# Patient Record
Sex: MALE | URBAN - METROPOLITAN AREA
[De-identification: names, ages, dates, MRNs, and addresses within clinical notes are randomized per-mention and may not be internally consistent; named-entity substitution may affect disease eponyms.]

---

## 2017-01-01 ENCOUNTER — MEDICAL CORRESPONDENCE (OUTPATIENT)
Dept: HEALTH INFORMATION MANAGEMENT | Facility: CLINIC | Age: 82
End: 2017-01-01

## 2017-01-01 ENCOUNTER — NURSING HOME VISIT (OUTPATIENT)
Dept: GERIATRICS | Facility: CLINIC | Age: 82
End: 2017-01-01
Payer: COMMERCIAL

## 2017-01-01 ENCOUNTER — TRANSFERRED RECORDS (OUTPATIENT)
Dept: HEALTH INFORMATION MANAGEMENT | Facility: CLINIC | Age: 82
End: 2017-01-01

## 2017-01-01 VITALS
OXYGEN SATURATION: 94 % | WEIGHT: 144.6 LBS | BODY MASS INDEX: 23.24 KG/M2 | TEMPERATURE: 97 F | RESPIRATION RATE: 20 BRPM | HEIGHT: 66 IN | HEART RATE: 66 BPM | DIASTOLIC BLOOD PRESSURE: 80 MMHG | SYSTOLIC BLOOD PRESSURE: 124 MMHG

## 2017-01-01 VITALS
RESPIRATION RATE: 20 BRPM | WEIGHT: 144.6 LBS | TEMPERATURE: 97 F | BODY MASS INDEX: 23.24 KG/M2 | HEIGHT: 66 IN | SYSTOLIC BLOOD PRESSURE: 118 MMHG | DIASTOLIC BLOOD PRESSURE: 70 MMHG | OXYGEN SATURATION: 94 % | HEART RATE: 78 BPM

## 2017-01-01 VITALS
SYSTOLIC BLOOD PRESSURE: 112 MMHG | DIASTOLIC BLOOD PRESSURE: 65 MMHG | RESPIRATION RATE: 19 BRPM | HEART RATE: 76 BPM | OXYGEN SATURATION: 99 % | TEMPERATURE: 97.6 F

## 2017-01-01 DIAGNOSIS — D63.1 ANEMIA IN CKD (CHRONIC KIDNEY DISEASE): ICD-10-CM

## 2017-01-01 DIAGNOSIS — T81.89XD NON-HEALING SURGICAL WOUND, SUBSEQUENT ENCOUNTER: ICD-10-CM

## 2017-01-01 DIAGNOSIS — H92.02 LEFT EAR PAIN: ICD-10-CM

## 2017-01-01 DIAGNOSIS — E03.9 ACQUIRED HYPOTHYROIDISM: ICD-10-CM

## 2017-01-01 DIAGNOSIS — H92.21 BLEEDING FROM RIGHT EAR: ICD-10-CM

## 2017-01-01 DIAGNOSIS — K59.00 CONSTIPATION, UNSPECIFIED CONSTIPATION TYPE: ICD-10-CM

## 2017-01-01 DIAGNOSIS — H92.21 BLEEDING FROM RIGHT EAR: Primary | ICD-10-CM

## 2017-01-01 DIAGNOSIS — I10 ESSENTIAL HYPERTENSION: ICD-10-CM

## 2017-01-01 DIAGNOSIS — R26.9 GAIT DISTURBANCE: ICD-10-CM

## 2017-01-01 DIAGNOSIS — I50.42 CHRONIC COMBINED SYSTOLIC AND DIASTOLIC CHF (CONGESTIVE HEART FAILURE) (H): ICD-10-CM

## 2017-01-01 DIAGNOSIS — N25.81 HYPERPARATHYROIDISM, SECONDARY (H): ICD-10-CM

## 2017-01-01 DIAGNOSIS — E53.9 VITAMIN B DEFICIENCY: ICD-10-CM

## 2017-01-01 DIAGNOSIS — Z51.5 HOSPICE CARE PATIENT: ICD-10-CM

## 2017-01-01 DIAGNOSIS — N18.9 ANEMIA IN CKD (CHRONIC KIDNEY DISEASE): ICD-10-CM

## 2017-01-01 DIAGNOSIS — N18.5 CKD (CHRONIC KIDNEY DISEASE) STAGE 5, GFR LESS THAN 15 ML/MIN (H): ICD-10-CM

## 2017-01-01 DIAGNOSIS — E03.9 HYPOTHYROIDISM, UNSPECIFIED TYPE: ICD-10-CM

## 2017-01-01 DIAGNOSIS — Z53.9 ERRONEOUS ENCOUNTER--DISREGARD: Primary | ICD-10-CM

## 2017-01-01 DIAGNOSIS — T81.89XD NON-HEALING SURGICAL WOUND, SUBSEQUENT ENCOUNTER: Primary | ICD-10-CM

## 2017-01-01 DIAGNOSIS — R60.0 EDEMA EXTREMITIES: ICD-10-CM

## 2017-01-01 DIAGNOSIS — C44.229 SQUAMOUS CELL CARCINOMA OF SKIN OF LEFT EAR: ICD-10-CM

## 2017-01-01 DIAGNOSIS — E78.49 OTHER HYPERLIPIDEMIA: ICD-10-CM

## 2017-01-01 DIAGNOSIS — C44.229 SQUAMOUS CELL CARCINOMA OF LEFT EAR: ICD-10-CM

## 2017-01-01 DIAGNOSIS — N18.4 CHRONIC KIDNEY DISEASE, STAGE IV (SEVERE) (H): ICD-10-CM

## 2017-01-01 LAB
BUN SERPL-MCNC: 110 MG/DL (ref 9–26)
CALCIUM SERPL-MCNC: 7.7 MG/DL (ref 8.4–10.2)
CHLORIDE SERPLBLD-SCNC: 104 MMOL/L (ref 98–109)
CO2 SERPL-SCNC: 15 MMOL/L (ref 22–31)
CREAT SERPL-MCNC: 4.34 MG/DL (ref 0.73–1.18)
DIFFERENTIAL: ABNORMAL
ERYTHROCYTE [DISTWIDTH] IN BLOOD BY AUTOMATED COUNT: 15.7 % (ref 11–15)
GFR SERPL CREATININE-BSD FRML MDRD: 10 ML/MIN/1.73M2
GLUCOSE SERPL-MCNC: 100 MG/DL (ref 70–100)
HCT VFR BLD AUTO: 25.2 % (ref 29–51)
HEMOGLOBIN: 8.7 G/DL (ref 13.4–17.5)
MCV RBC AUTO: 90 FL (ref 80–100)
PLATELET # BLD AUTO: 100 K/CMM (ref 140–450)
POTASSIUM SERPL-SCNC: 4.7 MMOL/L (ref 3.5–5.2)
RBC # BLD AUTO: 2.8 M/CMM (ref 4.2–5.9)
SODIUM SERPL-SCNC: 131 MMOL/L (ref 136–145)
WBC # BLD AUTO: 2.9 K/CMM (ref 3.8–11)

## 2017-01-01 PROCEDURE — 99308 SBSQ NF CARE LOW MDM 20: CPT | Mod: GV | Performed by: NURSE PRACTITIONER

## 2017-01-01 PROCEDURE — 99207 ZZC CDG-CORRECTLY CODED, REVIEWED AND AGREE: CPT | Performed by: INTERNAL MEDICINE

## 2017-01-01 PROCEDURE — 99306 1ST NF CARE HIGH MDM 50: CPT | Performed by: INTERNAL MEDICINE

## 2017-01-01 PROCEDURE — 99207 ZZC CDG-CORRECTLY CODED, REVIEWED AND AGREE: CPT | Performed by: NURSE PRACTITIONER

## 2017-01-01 RX ORDER — CARVEDILOL 6.25 MG/1
6.25 TABLET ORAL 2 TIMES DAILY WITH MEALS
COMMUNITY

## 2017-01-01 RX ORDER — LEVOTHYROXINE SODIUM 100 UG/1
100 TABLET ORAL DAILY
COMMUNITY

## 2017-01-01 RX ORDER — AMOXICILLIN 250 MG
2 CAPSULE ORAL DAILY PRN
COMMUNITY

## 2017-01-01 RX ORDER — OXYCODONE HYDROCHLORIDE 5 MG/1
2.5 TABLET ORAL
COMMUNITY

## 2017-01-01 RX ORDER — MORPHINE SULFATE 100 MG/5ML
5 SOLUTION ORAL
COMMUNITY

## 2017-01-01 RX ORDER — SIMVASTATIN 40 MG
40 TABLET ORAL AT BEDTIME
COMMUNITY
End: 2017-01-01

## 2017-01-01 RX ORDER — ACETAMINOPHEN 500 MG
500 TABLET ORAL EVERY 6 HOURS PRN
COMMUNITY

## 2017-01-01 RX ORDER — TAMSULOSIN HYDROCHLORIDE 0.4 MG/1
0.4 CAPSULE ORAL AT BEDTIME
COMMUNITY

## 2017-01-01 RX ORDER — HALOPERIDOL 2 MG/ML
2 SOLUTION ORAL EVERY 6 HOURS PRN
COMMUNITY

## 2017-01-01 RX ORDER — FUROSEMIDE 20 MG
20 TABLET ORAL EVERY MORNING
COMMUNITY
Start: 2017-01-01

## 2017-01-01 RX ORDER — CALCITRIOL 0.25 UG/1
0.25 CAPSULE, LIQUID FILLED ORAL
COMMUNITY

## 2017-01-01 RX ORDER — ISOSORBIDE MONONITRATE 30 MG/1
30 TABLET, EXTENDED RELEASE ORAL DAILY
COMMUNITY

## 2017-01-01 RX ORDER — TRAMADOL HYDROCHLORIDE 50 MG/1
25-50 TABLET ORAL EVERY 6 HOURS PRN
COMMUNITY
End: 2017-01-01

## 2017-05-03 PROBLEM — E53.9 VITAMIN B DEFICIENCY: Status: ACTIVE | Noted: 2017-01-01

## 2017-05-03 PROBLEM — Z87.891 HISTORY OF TOBACCO USE: Status: ACTIVE | Noted: 2017-01-01

## 2017-05-03 PROBLEM — I10 ESSENTIAL HYPERTENSION: Status: ACTIVE | Noted: 2017-01-01

## 2017-05-03 PROBLEM — N18.9 ANEMIA IN CKD (CHRONIC KIDNEY DISEASE): Status: ACTIVE | Noted: 2017-01-01

## 2017-05-03 PROBLEM — Z00.00 ROUTINE GENERAL MEDICAL EXAMINATION AT A HEALTH CARE FACILITY: Status: ACTIVE | Noted: 2017-01-01

## 2017-05-03 PROBLEM — Z79.891 LONG TERM PRESCRIPTION OPIATE USE: Status: ACTIVE | Noted: 2017-01-01

## 2017-05-03 PROBLEM — H92.21 BLEEDING FROM RIGHT EAR: Status: ACTIVE | Noted: 2017-01-01

## 2017-05-03 PROBLEM — I65.22 OCCLUSION OF LEFT CAROTID ARTERY: Status: ACTIVE | Noted: 2017-01-01

## 2017-05-03 PROBLEM — N25.81 HYPERPARATHYROIDISM, SECONDARY (H): Status: ACTIVE | Noted: 2017-01-01

## 2017-05-03 PROBLEM — D63.1 ANEMIA IN CKD (CHRONIC KIDNEY DISEASE): Status: ACTIVE | Noted: 2017-01-01

## 2017-05-03 PROBLEM — E78.5 HYPERLIPIDEMIA: Status: ACTIVE | Noted: 2017-01-01

## 2017-05-03 PROBLEM — K59.00 CONSTIPATION: Status: ACTIVE | Noted: 2017-01-01

## 2017-05-03 PROBLEM — E03.9 HYPOTHYROIDISM: Status: ACTIVE | Noted: 2017-01-01

## 2017-05-03 PROBLEM — G45.9 TRANSIENT CEREBRAL ISCHEMIA: Status: ACTIVE | Noted: 2017-01-01

## 2017-05-03 PROBLEM — T81.89XD NON-HEALING SURGICAL WOUND, SUBSEQUENT ENCOUNTER: Status: ACTIVE | Noted: 2017-01-01

## 2017-05-03 PROBLEM — I50.42 CHRONIC COMBINED SYSTOLIC AND DIASTOLIC CHF (CONGESTIVE HEART FAILURE) (H): Status: ACTIVE | Noted: 2017-01-01

## 2017-05-03 NOTE — PROGRESS NOTES
"[facility]  PRIMARY CARE PROVIDER AND CLINIC:  [facility] [address] / [address] [address]  Chief Complaint   Patient presents with     Establish Care       HPI:    Cameron Stockton is a 89 year old  (4/10/1917),admitted to the [facility]  from Home; Hospice through [facility] beginning 4/26/17. Admitted to this facility for  medical management, nursing care and hospice. Current issues are:     Per 4/20/17 progress note by Usman Benson MD:    \"Cameron Stockton presents to the wound clinic at [facility] for evaluation and treatment of his left ear wound. 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 The patient is accompanied in the wound clinic by his son-in-law and his wife. They all provide portions of the history. I also collected portions of the patient's history from the electronic medical record. The patient's son-in-law indicates that they have elected not to proceed with any further surgery or palliative radiation therapy. Both patient and his family members state that they understand that we cannot get the wound to heal because there is cancer there. They state that their goal is to make the dressing changes more comfortable and to decrease bleeding from the wound. The patient's son-in-law reports he had been doing the dressing changes himself but more recently there are home health nurses coming out doing the dressing changes once a day. The dressing changes consist of a wet-to-dry dressing with gauze soaked with 50% Vashe and 50% lidocaine. They report however that the patient has quite a bit of pain with dressing changes and there has been more leading from the wound recently.   They confirmed that the wound has been present since his last surgery which was over a year ago in [place].   Today he denies fevers or " "chills. he reports the pain is a 0/10, which remained the same recently.   The patient reports maintaining a regular diet without special attention to protein intake.    I have suggested that instead of doing a wet-to-dry dressing with the Vashe lidocaine mixed we should do a 10 minute soak with the 50-50 mix of Vashe and lidocaine. After this soak we should line the cavity with a single layer of Adaptic and then fill the cavity with silver alginate for absorption and antimicrobial effect followed by an ABD pad changed once a day and p.r.n.   I confirmed the patient's belief that the wound will not heal while there is cancer in the wound bed and so our goal here is not wound healing but rather treatment of these symptoms relating to the wound which we call palliative wound care. Our goals here will to be to make the dressing changes less painful and to decrease bleeding. I'm hoping to be able to this by making the bandages less adherent to the wound bed.   I also explained to him the importance of protein to wound healing and have recommended he look into taking a protein supplement to aid in his wound healing. I explained to the patient that wound healing requires a substantial amount of protein and I recommend the patient take 2-3 protein supplements a day with the goal of consuming 100 g of protein a day.   The patient will return to the wound clinic in four weeks to see me again.\"    **Of note pt was in the hospital 3/27/17 when he had returned from wintering gin [place] and went in with incased LE edema and Sob.  He was up 20# and was diuresed.  At that time  Dialysis was offered to him, he declined. He was sent home to his senior appt where he lived with his wife.    Today on exam, he is very [place] but says: No CP, SOB,  dizziness, fevers, chills, HA, N/V, dysuria.  Some constipation at times. Appetite is fair.  No pain except left ear.   Occas Cough.         CODE STATUS/ADVANCE DIRECTIVES DISCUSSION:   DNR / " DNI  Patient's living condition: lives with spouse    ALLERGIES:Doxycycline  PAST MEDICAL HISTORY:  has a past medical history of Asthmatic bronchitis; CVA (cerebral infarction); HTN; and Hypothyroid.  PAST SURGICAL HISTORY:  has a past surgical history that includes hernia repair, inguinal rt/lt; cataract iol, rt/lt (8-04); and cataract iol, rt/lt (9-04).  FAMILY HISTORY: family history is not on file.  SOCIAL HISTORY:  reports that he has quit smoking. He does not have any smokeless tobacco history on file.    Post Discharge Medication Reconciliation Status: discharge medications reconciled and changed, per note/orders (see AVS).  Current Outpatient Prescriptions   Medication Sig Dispense Refill     acetaminophen (TYLENOL) 500 MG tablet Take 500 mg by mouth every 6 hours as needed for mild pain       calcitRIOL (ROCALTROL) 0.25 MCG capsule Take 0.25 mcg by mouth Every Mon, Wed, Fri       carvedilol (COREG) 6.25 MG tablet Take 6.25 mg by mouth 2 times daily (with meals) Hold if SBP <110 or HR <55 call if held x 2 in a row or symptomatic       furosemide (LASIX) 20 MG tablet Take 40 mg by mouth every morning       isosorbide mononitrate (IMDUR) 30 MG 24 hr tablet Take 30 mg by mouth daily Hold if HR <55 call if held x 2 in a row or symptomatic       levothyroxine (SYNTHROID/LEVOTHROID) 100 MCG tablet Take 100 mcg by mouth daily       morphine 0.1% in solosite topical gel Apply to left ear wound with vaseline gauze, allow to sit 20-30 minutes then remove gauze and finish with drsg changes per intructionns       oxyCODONE (ROXICODONE) 5 MG IR tablet Take 2.5 mg by mouth every 2 hours as needed for moderate to severe pain       senna-docusate (SENOKOT-S;PERICOLACE) 8.6-50 MG per tablet Take 2 tablets by mouth daily as needed for constipation       Dakins 0.125 % SOLN Use to irrigate wound behind left ear daily       tamsulosin (FLOMAX) 0.4 MG capsule Take 0.4 mg by mouth At Bedtime       traMADol (ULTRAM) 50 MG tablet  Take 25-50 mg by mouth every 6 hours as needed for moderate pain Give 25 mg for pain 1-5/10 or 50 mg for pain 6-10/10       morphine sulfate, high concentrate, (ROXANOL-CONCENTRATED) 20 MG/ML concentrated solution Take 5 mg by mouth every 2 hours as needed for shortness of breath / dyspnea or moderate to severe pain         ROS:  10 point ROS of systems including Constitutional, Eyes, Respiratory, Cardiovascular, Gastroenterology, Genitourinary, Integumentary, Muscularskeletal, Psychiatric were all negative except for pertinent positives noted in my HPI.    Exam:  /65  Pulse 76  Temp 97.6  F (36.4  C)  Resp 19  SpO2 99%     GENERAL APPEARANCE:  Alert, in no distress, oriented, cooperative  ENT:  Mouth and posterior oropharynx normal, moist mucous membranes, [place], ear pinna abnormal right with dried blood/open small area.  on left side has large open ulcerated wound about 6cm x 4cm rough diameter(see nurses notes for exact measurements)  EYES:  EOM, conjunctivae, lids, pupils and irises normal  NECK:  No adenopathy,masses or thyromegaly  RESP:  respiratory effort and palpation of chest normal, lungs clear to auscultation , no respiratory distress  CV:  Palpation and auscultation of heart done , RRR with soft NAJMA.  No rub, or gallop, +2 bilat feet to below knees' edema, +1 pedal pulses  ABDOMEN:  normal bowel sounds, soft, nontender, no hepatosplenomegaly or other masses  M/S:   DOLAN equally, up with assist and walker  SKIN:  Inspection of skin and subcutaneous tissue baseline, wound appearance: see under ENT section  NEURO:   Cranial nerves 2-12 are normal tested and grossly at patient's baseline  PSYCH:  oriented X 3, normal insight, judgement and memory, affect and mood normal       Lab/Diagnostic data: Hospital labs on 3/27/17:  WBC 3.5, Hgb 8.7, Na 133, K 4.4, , CO2 20, BUN 63, Cr 3.90, Ca 7.4, Phos 4.9, gluc 82, BNP 3318 and TSH 15.94      ASSESSMENT / PLAN:  (C44.229) Squamous cell carcinoma of  left ear/scalp   (T81.89XD) Non-healing surgical wound, subsequent encounter behind Lt. Ear   (H92.02) Left ear pain  Comment: cont with current pain meds, adjust drsg change, monitor.    (H92.21) Bleeding from right ear  Comment:  Will get Silver nitrate and try to cauterize on Friday when see pt. Likely a cancer as well.    (I50.42) Chronic combined systolic and diastolic CHF (congestive heart failure) (H)( EF 35-40% in July 2008)   (I10) Essential hypertension  Comment: cont current meds,  Need wt, appears compensated. Monitor. Check BMP in am    (N18.4) Chronic kidney disease, stage IV (severe) (H)  (R60.0) Edema extremities  Comment: see above, declined dialysis in past     (N18.9,  D63.1) Anemia in CKD (chronic kidney disease)  Comment: check CBC in am, chronic    (Z51.5) Hospice care patient  Comment: to have Hospice consult in am here when wife arrives as she will be enrolled also.    (R26.9) Gait disturbance  Comment: walker, fall risk    (E03.9) Hypothyroidism, unspecified type  Comment: cont current meds    (K59.00) Constipation, unspecified constipation type  Comment: cont current POC, monitor.    (E78.5) Hyperlipidemia  Comment: dc statin--risks > benefits    (E53.9) Vitamin B deficiency  Comment: dc med    (N25.81) Hyperparathyroidism, secondary (H)       Information reviewed:  Medications, vital signs, orders, nursing notes, problem list, hospital information. Total time spent with patient visit was 60 minutes including patient visit, review of past records and staff. Greater than 50% of total time spent with counseling and coordinating care.    Electronically signed by:  NURY Amaya CNP      The health plan new enrollment has happened. I have reviewed the  MDS, the preventative needs,  and facility care plan. The level of care is appropriate. I have reviewed the code status/advanced directives.

## 2017-05-05 PROBLEM — N18.5 CKD (CHRONIC KIDNEY DISEASE) STAGE 5, GFR LESS THAN 15 ML/MIN (H): Status: ACTIVE | Noted: 2017-01-01

## 2017-05-05 NOTE — PROGRESS NOTES
"chief complaint: bleeding right ear lobe    HPI: right ear lobe bleed ling briefly stopped, now resumed when drsg removed.    /70  Pulse 78  Temp 97  F (36.1  C)  Resp 20  Ht 5' 6\" (1.676 m)  Wt 144 lb 9.6 oz (65.6 kg)  SpO2 94%  BMI 23.34 kg/m2   Current Outpatient Prescriptions   Medication     acetaminophen (TYLENOL) 500 MG tablet     calcitRIOL (ROCALTROL) 0.25 MCG capsule     carvedilol (COREG) 6.25 MG tablet     furosemide (LASIX) 20 MG tablet     isosorbide mononitrate (IMDUR) 30 MG 24 hr tablet     levothyroxine (SYNTHROID/LEVOTHROID) 100 MCG tablet     morphine 0.1% in solosite topical gel     oxyCODONE (ROXICODONE) 5 MG IR tablet     senna-docusate (SENOKOT-S;PERICOLACE) 8.6-50 MG per tablet     Dakins 0.125 % SOLN     tamsulosin (FLOMAX) 0.4 MG capsule     traMADol (ULTRAM) 50 MG tablet     morphine sulfate, high concentrate, (ROXANOL-CONCENTRATED) 20 MG/ML concentrated solution     No current facility-administered medications for this visit.        ROS: C/O mild pain when hairs pulled by drsg.    EXAM:  GEN: Alert, oriented, NAD, [place]  HEENT:Right ear examined: 3 small recessed areas which are oozing blood.  Drsg intact left ear/side of face.  NEURO: no acute issues, globally intact and at baseline  M/S:  Up with walker and SBA    LABS: reivewed from yesterday-- BUN  110( was 63), Cr 4.32( was mid 3's). See \"Results tab\"    PROCEDURE:  15 minutes spent using 3 silver nitrate sticks to cauterize the wounds.  Hemostasis seems to be holding. I asked nurses to monitor and NOT apply drsg unless re-bleeds and call providers.      ASSESSMENT / PLAN:  (H.21) Bleeding from right ear  (primary encounter diagnosis)  Comment:  See above  Plan: HC CAUTERIZE WOUND W SILVER NITRATE      (N.5) CKD (chronic kidney disease) stage 5, GFR less than 15 ml/min (H)  Comment: encourage fluids and hold lasix next dose then decrease to 20 mg daily. Goal is comfort, appreciate Hospice care.            Total time with " patient visit: 15 minutes including discussions about the POC and care coordination with the patient and staff. Greater than 50% of total time spent with counseling and coordinating care..

## 2017-05-12 PROBLEM — C44.229 SQUAMOUS CELL CARCINOMA OF SKIN OF LEFT EAR: Status: ACTIVE | Noted: 2017-01-01

## 2017-05-12 NOTE — PROGRESS NOTES
"Cameron Stockton is a 89 year old male seen May 12, 2017 at [facility] where he was admitted from home earlier this month.    Patient was admitted with his wife of 75 years Bisi, but she  yesterday.     Patient has SCC of left ear, for which he underwent excision a year ago in [place], where they wintered.    This did not heal and has worsened.   Patient elected not to proceed with further surgery or XRT.   Patient is seen in his room, up to chair having lunch.   Ate a little of his hamburger, and is having his dessert.   States he is feeling okay,denies current pain.   Reports his head \"hurt a while ago, but now it's better.\"     Patient was hospitalized in March of this year with dyspnea and LE edema, diuresed at that time.   Dialysis was offered, but patient declined.       Past Medical History:   Diagnosis Date     Asthmatic bronchitis      CVA (cerebral infarction)      HTN      Hypothyroid    CKD, stage 4-5  CHF, EF 35-40% by ECHO in       Past Surgical History:   Procedure Laterality Date     CATARACT IOL, RT/LT      right     CATARACT IOL, RT/LT      left     HERNIA REPAIR, INGUINAL RT/LT      right     Social History   Substance Use Topics     Smoking status: Former Smoker     Smokeless tobacco: Not on file     Alcohol use Not on file      SH:  Previously resided in a Senior apartment with his wife, who  just yesterday.      Daughter Shy and grandson Kosta live locally.     FH:  [place]    Review Of Systems  Skin: as above  Eyes: impaired vision  Ears/Nose/Throat: hearing loss is profound  Respiratory: No shortness of breath, dyspnea on exertion, cough, or hemoptysis  Cardiovascular: lower extremity edema and exercise intolerance  Gastrointestinal: negative  Genitourinary: BPH  Musculoskeletal: transfers with assist and walker.     Neurologic: negative  Psychiatric: negative  Hematologic/Lymphatic/Immunologic: anemia  Endocrine: thyroid disorder      GENERAL APPEARANCE: " "alert and no distress, very [place]  /80  Pulse 66  Temp 97  F (36.1  C)  Resp 20  Ht 5' 6\" (1.676 m)  Wt 144 lb 9.6 oz (65.6 kg)  SpO2 94%  BMI 23.34 kg/m2   HEENT: normocephalic, mass along lobe and below left ear, dressed with bandage.   No current bleeding.     NECK: no adenopathy, no asymmetry, masses, or scars and thyroid normal to palpation  RESP: decreased BS, few scattered crackles  CV: regular rate and rhythm, normal S1 S2, 2/6 NAJMA  ABDOMEN:  soft, nontender, no HSM or masses and bowel sounds normal  MS: extremities normal- no gross deformities noted, no evidence of inflammation in joints, 1+ LE edema  SKIN: chronic skin changes  NEURO: Normal strength and tone, sensory exam grossly normal, and speech normal  PSYCH: affect okay  LYMPHATICS: No cervical,  or supraclavicular nodes     Lab Results   Component Value Date    WBC 2.9 05/04/2017     Lab Results   Component Value Date    HGB 8.7 05/04/2017     Lab Results   Component Value Date    MCV 90.0 05/04/2017     Lab Results   Component Value Date     05/04/2017      Last Basic Metabolic Panel:  Lab Results   Component Value Date     05/04/2017      Lab Results   Component Value Date    POTASSIUM 4.7 05/04/2017     Lab Results   Component Value Date    CHLORIDE 104 05/04/2017     Lab Results   Component Value Date    BETTY 7.7 05/04/2017     Lab Results   Component Value Date    CO2 15 05/04/2017     Lab Results   Component Value Date     05/04/2017     Lab Results   Component Value Date    CR 4.34 05/04/2017   GFR 10  Lab Results   Component Value Date     05/04/2017     TSH   Date Value Ref Range Status   08/03/2009 1.64 0.4 - 5.0 mU/L Final       IMP/PLAN:  (T81.89XD) Non-healing surgical wound, subsequent encounter behind Lt. Ear  (primary encounter diagnosis)  (C44.229) Squamous cell carcinoma of skin of left ear  Comment: non-healing, bleeding stopped with cauterization  Plan: continue local wound care  Patient and " family decline further surgery or intervention.        (N18.5) CKD (chronic kidney disease) stage 5, GFR less than 15 ml/min (H)  Comment: at end stage, patient declines dialysis.     Plan: Hospice care for comfort at end of life.       (I50.42) Chronic combined systolic and diastolic CHF (congestive heart failure) (H)  Comment: mild volume overload  Plan: continue carvedilol, Imdur, furosemide   Treat for symptoms; has MS available for dyspnea or pain.       (E03.9) Acquired hypothyroidism  Comment: on replacement  Plan: same dose     (N18.9,  D63.1) Anemia in CKD (chronic kidney disease)  Comment: with pancytopenia  Plan: Hospice care    (N25.81) Hyperparathyroidism, secondary (H)  Comment: to ESRD  Plan: continue calcitriol     Alma Cisneros MD